# Patient Record
Sex: FEMALE | NOT HISPANIC OR LATINO
[De-identification: names, ages, dates, MRNs, and addresses within clinical notes are randomized per-mention and may not be internally consistent; named-entity substitution may affect disease eponyms.]

---

## 2017-07-10 ENCOUNTER — RESULT REVIEW (OUTPATIENT)
Age: 32
End: 2017-07-10

## 2017-09-29 ENCOUNTER — ASOB RESULT (OUTPATIENT)
Age: 32
End: 2017-09-29

## 2017-09-29 ENCOUNTER — APPOINTMENT (OUTPATIENT)
Dept: ANTEPARTUM | Facility: CLINIC | Age: 32
End: 2017-09-29
Payer: COMMERCIAL

## 2017-09-29 PROCEDURE — 76811 OB US DETAILED SNGL FETUS: CPT

## 2018-01-29 ENCOUNTER — OUTPATIENT (OUTPATIENT)
Dept: OUTPATIENT SERVICES | Facility: HOSPITAL | Age: 33
LOS: 1 days | End: 2018-01-29
Payer: COMMERCIAL

## 2018-01-29 DIAGNOSIS — O26.899 OTHER SPECIFIED PREGNANCY RELATED CONDITIONS, UNSPECIFIED TRIMESTER: ICD-10-CM

## 2018-01-29 DIAGNOSIS — Z3A.00 WEEKS OF GESTATION OF PREGNANCY NOT SPECIFIED: ICD-10-CM

## 2018-01-29 LAB
BLD GP AB SCN SERPL QL: NEGATIVE — SIGNIFICANT CHANGE UP
HCT VFR BLD CALC: 33.9 % — LOW (ref 34.5–45)
HGB BLD-MCNC: 12.3 G/DL — SIGNIFICANT CHANGE UP (ref 11.5–15.5)
MCHC RBC-ENTMCNC: 33.9 PG — SIGNIFICANT CHANGE UP (ref 27–34)
MCHC RBC-ENTMCNC: 36.3 GM/DL — HIGH (ref 32–36)
MCV RBC AUTO: 93.4 FL — SIGNIFICANT CHANGE UP (ref 80–100)
PLATELET # BLD AUTO: 100 K/UL — LOW (ref 150–400)
RBC # BLD: 3.63 M/UL — LOW (ref 3.8–5.2)
RBC # FLD: 12 % — SIGNIFICANT CHANGE UP (ref 10.3–14.5)
RH IG SCN BLD-IMP: POSITIVE — SIGNIFICANT CHANGE UP
T PALLIDUM AB TITR SER: NEGATIVE — SIGNIFICANT CHANGE UP
WBC # BLD: 8.6 K/UL — SIGNIFICANT CHANGE UP (ref 3.8–10.5)
WBC # FLD AUTO: 8.6 K/UL — SIGNIFICANT CHANGE UP (ref 3.8–10.5)

## 2018-01-29 PROCEDURE — 59025 FETAL NON-STRESS TEST: CPT

## 2018-01-29 PROCEDURE — 86901 BLOOD TYPING SEROLOGIC RH(D): CPT

## 2018-01-29 PROCEDURE — 86850 RBC ANTIBODY SCREEN: CPT

## 2018-01-29 PROCEDURE — 59025 FETAL NON-STRESS TEST: CPT | Mod: 26

## 2018-01-29 PROCEDURE — G0463: CPT

## 2018-01-29 PROCEDURE — 86900 BLOOD TYPING SEROLOGIC ABO: CPT

## 2018-01-29 PROCEDURE — 86780 TREPONEMA PALLIDUM: CPT

## 2018-01-29 PROCEDURE — 85027 COMPLETE CBC AUTOMATED: CPT

## 2018-01-30 ENCOUNTER — TRANSCRIPTION ENCOUNTER (OUTPATIENT)
Age: 33
End: 2018-01-30

## 2018-01-30 ENCOUNTER — INPATIENT (INPATIENT)
Facility: HOSPITAL | Age: 33
LOS: 2 days | Discharge: HOME | End: 2018-02-02
Attending: OBSTETRICS & GYNECOLOGY | Admitting: OBSTETRICS & GYNECOLOGY
Payer: COMMERCIAL

## 2018-01-30 VITALS — WEIGHT: 141.1 LBS

## 2018-01-30 DIAGNOSIS — Z3A.00 WEEKS OF GESTATION OF PREGNANCY NOT SPECIFIED: ICD-10-CM

## 2018-01-30 DIAGNOSIS — O26.899 OTHER SPECIFIED PREGNANCY RELATED CONDITIONS, UNSPECIFIED TRIMESTER: ICD-10-CM

## 2018-01-30 DIAGNOSIS — Z34.80 ENCOUNTER FOR SUPERVISION OF OTHER NORMAL PREGNANCY, UNSPECIFIED TRIMESTER: ICD-10-CM

## 2018-01-30 LAB
BASOPHILS # BLD AUTO: 0 K/UL — SIGNIFICANT CHANGE UP (ref 0–0.2)
EOSINOPHIL # BLD AUTO: 0 K/UL — SIGNIFICANT CHANGE UP (ref 0–0.5)
HCT VFR BLD CALC: 36.6 % — SIGNIFICANT CHANGE UP (ref 34.5–45)
HGB BLD-MCNC: 12.6 G/DL — SIGNIFICANT CHANGE UP (ref 11.5–15.5)
LYMPHOCYTES # BLD AUTO: 1.4 K/UL — SIGNIFICANT CHANGE UP (ref 1–3.3)
LYMPHOCYTES # BLD AUTO: 8 % — LOW (ref 13–44)
MCHC RBC-ENTMCNC: 32.6 PG — SIGNIFICANT CHANGE UP (ref 27–34)
MCHC RBC-ENTMCNC: 34.4 GM/DL — SIGNIFICANT CHANGE UP (ref 32–36)
MCV RBC AUTO: 94.6 FL — SIGNIFICANT CHANGE UP (ref 80–100)
METAMYELOCYTES # FLD: 6 % — HIGH (ref 0–0)
MONOCYTES # BLD AUTO: 0.4 K/UL — SIGNIFICANT CHANGE UP (ref 0–0.9)
MONOCYTES NFR BLD AUTO: 5 % — SIGNIFICANT CHANGE UP (ref 2–14)
MYELOCYTES NFR BLD: 5 % — HIGH (ref 0–0)
NEUTROPHILS # BLD AUTO: 6.8 K/UL — SIGNIFICANT CHANGE UP (ref 1.8–7.4)
NEUTROPHILS NFR BLD AUTO: 65 % — SIGNIFICANT CHANGE UP (ref 43–77)
NEUTS BAND # BLD: 11 % — HIGH (ref 0–8)
NRBC # BLD: 2 /100 — HIGH (ref 0–0)
PLAT MORPH BLD: NORMAL — SIGNIFICANT CHANGE UP
PLATELET # BLD AUTO: 94 K/UL — LOW (ref 150–400)
POLYCHROMASIA BLD QL SMEAR: SIGNIFICANT CHANGE UP
RBC # BLD: 3.87 M/UL — SIGNIFICANT CHANGE UP (ref 3.8–5.2)
RBC # FLD: 11.8 % — SIGNIFICANT CHANGE UP (ref 10.3–14.5)
RBC BLD AUTO: ABNORMAL
WBC # BLD: 8.6 K/UL — SIGNIFICANT CHANGE UP (ref 3.8–10.5)
WBC # FLD AUTO: 8.6 K/UL — SIGNIFICANT CHANGE UP (ref 3.8–10.5)

## 2018-01-30 RX ORDER — SODIUM CHLORIDE 9 MG/ML
1000 INJECTION, SOLUTION INTRAVENOUS
Qty: 0 | Refills: 0 | Status: DISCONTINUED | OUTPATIENT
Start: 2018-01-30 | End: 2018-02-02

## 2018-01-30 RX ORDER — CEFAZOLIN SODIUM 1 G
2000 VIAL (EA) INJECTION EVERY 8 HOURS
Qty: 0 | Refills: 0 | Status: COMPLETED | OUTPATIENT
Start: 2018-01-30 | End: 2018-01-31

## 2018-01-30 RX ORDER — LEVOTHYROXINE SODIUM 125 MCG
50 TABLET ORAL DAILY
Qty: 0 | Refills: 0 | Status: DISCONTINUED | OUTPATIENT
Start: 2018-01-30 | End: 2018-02-02

## 2018-01-30 RX ORDER — OXYCODONE HYDROCHLORIDE 5 MG/1
5 TABLET ORAL EVERY 4 HOURS
Qty: 0 | Refills: 0 | Status: DISCONTINUED | OUTPATIENT
Start: 2018-01-30 | End: 2018-02-02

## 2018-01-30 RX ORDER — HEPARIN SODIUM 5000 [USP'U]/ML
5000 INJECTION INTRAVENOUS; SUBCUTANEOUS EVERY 12 HOURS
Qty: 0 | Refills: 0 | Status: DISCONTINUED | OUTPATIENT
Start: 2018-01-30 | End: 2018-02-02

## 2018-01-30 RX ORDER — NALOXONE HYDROCHLORIDE 4 MG/.1ML
0.1 SPRAY NASAL
Qty: 0 | Refills: 0 | Status: DISCONTINUED | OUTPATIENT
Start: 2018-01-30 | End: 2018-02-01

## 2018-01-30 RX ORDER — LANOLIN
1 OINTMENT (GRAM) TOPICAL
Qty: 0 | Refills: 0 | Status: DISCONTINUED | OUTPATIENT
Start: 2018-01-30 | End: 2018-02-02

## 2018-01-30 RX ORDER — ACETAMINOPHEN 500 MG
975 TABLET ORAL EVERY 6 HOURS
Qty: 0 | Refills: 0 | Status: DISCONTINUED | OUTPATIENT
Start: 2018-01-30 | End: 2018-02-02

## 2018-01-30 RX ORDER — OXYCODONE HYDROCHLORIDE 5 MG/1
5 TABLET ORAL
Qty: 0 | Refills: 0 | Status: COMPLETED | OUTPATIENT
Start: 2018-01-30 | End: 2018-02-06

## 2018-01-30 RX ORDER — IBUPROFEN 200 MG
600 TABLET ORAL EVERY 6 HOURS
Qty: 0 | Refills: 0 | Status: DISCONTINUED | OUTPATIENT
Start: 2018-01-30 | End: 2018-02-02

## 2018-01-30 RX ORDER — FAMOTIDINE 10 MG/ML
20 INJECTION INTRAVENOUS ONCE
Qty: 0 | Refills: 0 | Status: COMPLETED | OUTPATIENT
Start: 2018-01-30 | End: 2018-01-30

## 2018-01-30 RX ORDER — IBUPROFEN 200 MG
600 TABLET ORAL EVERY 6 HOURS
Qty: 0 | Refills: 0 | Status: COMPLETED | OUTPATIENT
Start: 2018-01-30 | End: 2018-12-29

## 2018-01-30 RX ORDER — TETANUS TOXOID, REDUCED DIPHTHERIA TOXOID AND ACELLULAR PERTUSSIS VACCINE, ADSORBED 5; 2.5; 8; 8; 2.5 [IU]/.5ML; [IU]/.5ML; UG/.5ML; UG/.5ML; UG/.5ML
0.5 SUSPENSION INTRAMUSCULAR ONCE
Qty: 0 | Refills: 0 | Status: DISCONTINUED | OUTPATIENT
Start: 2018-01-30 | End: 2018-02-02

## 2018-01-30 RX ORDER — HYDROMORPHONE HYDROCHLORIDE 2 MG/ML
0.5 INJECTION INTRAMUSCULAR; INTRAVENOUS; SUBCUTANEOUS
Qty: 0 | Refills: 0 | Status: DISCONTINUED | OUTPATIENT
Start: 2018-01-30 | End: 2018-02-01

## 2018-01-30 RX ORDER — SODIUM CHLORIDE 9 MG/ML
1000 INJECTION, SOLUTION INTRAVENOUS
Qty: 0 | Refills: 0 | Status: DISCONTINUED | OUTPATIENT
Start: 2018-01-30 | End: 2018-02-01

## 2018-01-30 RX ORDER — DOCUSATE SODIUM 100 MG
100 CAPSULE ORAL
Qty: 0 | Refills: 0 | Status: DISCONTINUED | OUTPATIENT
Start: 2018-01-30 | End: 2018-02-02

## 2018-01-30 RX ORDER — HYDROMORPHONE HYDROCHLORIDE 2 MG/ML
30 INJECTION INTRAMUSCULAR; INTRAVENOUS; SUBCUTANEOUS
Qty: 0 | Refills: 0 | Status: DISCONTINUED | OUTPATIENT
Start: 2018-01-30 | End: 2018-02-01

## 2018-01-30 RX ORDER — CEFAZOLIN SODIUM 1 G
2000 VIAL (EA) INJECTION ONCE
Qty: 0 | Refills: 0 | Status: DISCONTINUED | OUTPATIENT
Start: 2018-01-30 | End: 2018-02-01

## 2018-01-30 RX ORDER — DIPHENHYDRAMINE HCL 50 MG
25 CAPSULE ORAL EVERY 6 HOURS
Qty: 0 | Refills: 0 | Status: DISCONTINUED | OUTPATIENT
Start: 2018-01-30 | End: 2018-02-02

## 2018-01-30 RX ORDER — OXYTOCIN 10 UNIT/ML
41.67 VIAL (ML) INJECTION
Qty: 20 | Refills: 0 | Status: DISCONTINUED | OUTPATIENT
Start: 2018-01-30 | End: 2018-01-30

## 2018-01-30 RX ORDER — SODIUM CHLORIDE 9 MG/ML
1000 INJECTION, SOLUTION INTRAVENOUS ONCE
Qty: 0 | Refills: 0 | Status: COMPLETED | OUTPATIENT
Start: 2018-01-30 | End: 2018-01-30

## 2018-01-30 RX ORDER — GLYCERIN ADULT
1 SUPPOSITORY, RECTAL RECTAL AT BEDTIME
Qty: 0 | Refills: 0 | Status: DISCONTINUED | OUTPATIENT
Start: 2018-01-30 | End: 2018-02-02

## 2018-01-30 RX ORDER — OXYTOCIN 10 UNIT/ML
333.33 VIAL (ML) INJECTION
Qty: 20 | Refills: 0 | Status: DISCONTINUED | OUTPATIENT
Start: 2018-01-30 | End: 2018-02-02

## 2018-01-30 RX ORDER — CITRIC ACID/SODIUM CITRATE 300-500 MG
15 SOLUTION, ORAL ORAL ONCE
Qty: 0 | Refills: 0 | Status: COMPLETED | OUTPATIENT
Start: 2018-01-30 | End: 2018-01-30

## 2018-01-30 RX ORDER — FERROUS SULFATE 325(65) MG
325 TABLET ORAL DAILY
Qty: 0 | Refills: 0 | Status: DISCONTINUED | OUTPATIENT
Start: 2018-01-30 | End: 2018-02-02

## 2018-01-30 RX ORDER — SIMETHICONE 80 MG/1
80 TABLET, CHEWABLE ORAL EVERY 4 HOURS
Qty: 0 | Refills: 0 | Status: DISCONTINUED | OUTPATIENT
Start: 2018-01-30 | End: 2018-02-02

## 2018-01-30 RX ORDER — METOCLOPRAMIDE HCL 10 MG
10 TABLET ORAL ONCE
Qty: 0 | Refills: 0 | Status: COMPLETED | OUTPATIENT
Start: 2018-01-30 | End: 2018-01-30

## 2018-01-30 RX ORDER — ONDANSETRON 8 MG/1
4 TABLET, FILM COATED ORAL EVERY 6 HOURS
Qty: 0 | Refills: 0 | Status: DISCONTINUED | OUTPATIENT
Start: 2018-01-30 | End: 2018-02-01

## 2018-01-30 RX ORDER — OXYTOCIN 10 UNIT/ML
41.67 VIAL (ML) INJECTION
Qty: 20 | Refills: 0 | Status: DISCONTINUED | OUTPATIENT
Start: 2018-01-30 | End: 2018-02-02

## 2018-01-30 RX ORDER — KETOROLAC TROMETHAMINE 30 MG/ML
30 SYRINGE (ML) INJECTION EVERY 6 HOURS
Qty: 0 | Refills: 0 | Status: DISCONTINUED | OUTPATIENT
Start: 2018-01-30 | End: 2018-02-01

## 2018-01-30 RX ADMIN — Medication 30 MILLIGRAM(S): at 21:00

## 2018-01-30 RX ADMIN — ONDANSETRON 4 MILLIGRAM(S): 8 TABLET, FILM COATED ORAL at 20:29

## 2018-01-30 RX ADMIN — FAMOTIDINE 20 MILLIGRAM(S): 10 INJECTION INTRAVENOUS at 13:26

## 2018-01-30 RX ADMIN — SODIUM CHLORIDE 2000 MILLILITER(S): 9 INJECTION, SOLUTION INTRAVENOUS at 12:35

## 2018-01-30 RX ADMIN — SODIUM CHLORIDE 125 MILLILITER(S): 9 INJECTION, SOLUTION INTRAVENOUS at 13:21

## 2018-01-30 RX ADMIN — Medication 15 MILLILITER(S): at 13:26

## 2018-01-30 RX ADMIN — HYDROMORPHONE HYDROCHLORIDE 30 MILLILITER(S): 2 INJECTION INTRAMUSCULAR; INTRAVENOUS; SUBCUTANEOUS at 18:20

## 2018-01-30 RX ADMIN — Medication 30 MILLIGRAM(S): at 20:30

## 2018-01-30 RX ADMIN — Medication 100 MILLIGRAM(S): at 21:50

## 2018-01-30 RX ADMIN — HYDROMORPHONE HYDROCHLORIDE 30 MILLILITER(S): 2 INJECTION INTRAMUSCULAR; INTRAVENOUS; SUBCUTANEOUS at 16:33

## 2018-01-30 RX ADMIN — Medication 10 MILLIGRAM(S): at 21:59

## 2018-01-30 NOTE — CONSULT NOTE ADULT - SUBJECTIVE AND OBJECTIVE BOX
Chief Complaint:      "Low platelets."    History of Present Illness:      Mrs. Mccray is a 32-year-old female who is referred here for evaluation and management of thrombocytopenia.  	She says that she is pregnant with her second child.  The platelets have been trending down and she was told to come here.  She reports that a couple of months ago the platelet count was 106,000, but last week it was 99,000.  She reports that in general she feels fine.  She has no bleeding, bruising.  There is no dark urine.  She is scheduled for a  section in February.    Past Medical History:      Hypothyroidism    Surgical History:       section    Allergies:      No Known Drug Allergies       Medications:      	Current Medications:   		Prenatal Vitamin  		Synthroid       Social History:      Employment: full-time.   Marital: .   Alcohol: Denies alcohol consumption.   Drugs: Denies IV drug use.   Tobacco: Denies smoking.       Family History:      Non-contributory.    Review of Systems:    General: Admits weight gain. Denies body aches, bone pain, chills, fatigue, fevers, loss of appetite, sweats.   Skin: Denies breakouts, dry skin, pruritis, rash.   HEENT: Denies change in vision, earaches, epistaxis, gingival bleeding, hoarseness, loss of hearing, rhinorrhea, sinus congestion, sore throat.   Cardiovascular: Denies chest pain, diaphoresis, palpitations.   Pulmonary: Admits mild dyspnea with exertion. Denies cough, hemoptysis, wheezing.   Gastrointestinal: Denies abdominal pain, bright red blood per rectum, constipation, dark stools, diarrhea, dysphagia, heartburn, nausea, vomiting.   Genitourinary: Admits frequency, nocturia. Denies change in urine color, dysuria, hematuria.   Extremities: Denies arthritis, joint pain, muscle cramps, swelling.   Hematologic: Denies bleeding, easy bruising, history of anemia, lymph node enlargement.   Neurological: Denies difficulty with balance, difficulty with coordination, dizziness, headache, lightheadedness, numbness, tingling, tremors.   Psychiatric: Admits occasional insomnia. Denies anxiety, depression.       Vital Signs:      Measurements - Reading on 2018: Height: 58 in Weight: 140 lbs BMI: 29.3 BSA: 1.57   Vitals Signs - Reading on 2018 at 2:00 PM: Sitting Blood Pressure: 110 / 60 Taken from: Left Arm, Automatic Pulse: 78 bpm Respiration Rate: 16       Physical Exam:      General: The patient appears well in no apparent distress.   Skin: Skin without pallor.  No rashes, erythema or ecchymosis noted.   HEENT: Extraocular muscles are intact.  Sclerae are anicteric.    Neck: The neck is supple with no JVD, no lymphadenopathy.   Heart: Normal S1, S2.  Regular rate and rhythm.  No murmurs, gallops or rubs.   Chest: Lungs are clear to auscultation bilaterally.  No wheezing or rhonchi noted.   Abdomen: Abdomen is soft, non-tender, and gravid.  Extremities: There is no edema, or tenderness.   Neurological: Alert and oriented x 3.  Good motor strength, moves all extremities.      Laboratory Data:      A CBC performed today reports a WBC count of 7.7.  The Hgb is 11.4 with an MCV of 95.  The platelet count is 103,000.    Assessment:      Mrs. Mccray is a 32-year-old female with a mild thrombocytopenia.  She is pregnant and scheduled for a  section next month.  Reportedly earlier in the pregnancy the platelet count was 106,000, but last week it was 99,000.  She does in general feel fine and she has no bleeding, or bruising.  A CBC performed today reports a platelet count of 103,000.  Of note there is also a mild normocytic anemia with a Hgb of 11.4.  	I discussed with the patient and her  who accompanies her today that at this time the platelet count is adequate.  She likely has ITP, or gestational thrombocytopenia, but will want to rule-out a microangiopathic state.  Goal is to maintain the platelet count above 100,000.  Therefore would plan to monitor and if it drops significantly below 100,000 would give a course of steroids.      Plan:      Will send labs today for a CMP, coags, fibrinogen, LDH, haptoglobin.  Will also send an anemia evaluation with iron studies, B12, and folate.  She will return here weekly until delivery to monitor the CBC.  She is due in three weeks. Chief Complaint:      f/u of ITP.    History of Present Illness:      Mrs. Mccray is a 32-year-old female who was referred to Dr. Elise  for evaluation and management of thrombocytopenia. Patient found to have gestational thrombocytopenia. Patient is now s/p delivery, epidural . Patient feels well. Without significant bleeding.    Past Medical History:      Hypothyroidism    Surgical History:       section    Allergies:      No Known Drug Allergies       Medications:      MEDICATIONS  (STANDING):  acetaminophen   Tablet. 975 milliGRAM(s) Oral every 6 hours  ceFAZolin   IVPB 2000 milliGRAM(s) IV Intermittent once  diphtheria/tetanus/pertussis (acellular) Vaccine (ADAcel) 0.5 milliLiter(s) IntraMuscular once  ferrous    sulfate 325 milliGRAM(s) Oral daily  heparin  Injectable 5000 Unit(s) SubCutaneous every 12 hours  HYDROmorphone PCA (1 mG/mL) 30 milliLiter(s) PCA Continuous PCA Continuous  ibuprofen  Tablet 600 milliGRAM(s) Oral every 6 hours  ketorolac   Injectable 30 milliGRAM(s) IV Push every 6 hours  lactated ringers. 1000 milliLiter(s) (125 mL/Hr) IV Continuous <Continuous>  lactated ringers. 1000 milliLiter(s) (125 mL/Hr) IV Continuous <Continuous>  lactated ringers. 1000 milliLiter(s) (125 mL/Hr) IV Continuous <Continuous>  levothyroxine 50 MICROGram(s) Oral daily  oxyCODONE    IR 5 milliGRAM(s) Oral every 3 hours  oxytocin Infusion 41.667 milliUNIT(s)/Min (125 mL/Hr) IV Continuous <Continuous>  oxytocin Infusion 333.333 milliUNIT(s)/Min (1000 mL/Hr) IV Continuous <Continuous>  prenatal multivitamin 1 Tablet(s) Oral daily     Social History:      Employment: full-time.   Marital: .   Alcohol: Denies alcohol consumption.   Drugs: Denies IV drug use.   Tobacco: Denies smoking.       Family History:      Non-contributory.    Review of Systems:    General:  Denies body aches, bone pain, chills, fatigue, fevers, loss of appetite, sweats.   Skin: Denies breakouts, dry skin, pruritis, rash.   HEENT: Denies change in vision, earaches, epistaxis, gingival bleeding, hoarseness, loss of hearing, rhinorrhea, sinus congestion, sore throat.   Cardiovascular: Denies chest pain, diaphoresis, palpitations.   Pulmonary: Admits mild dyspnea with exertion. Denies cough, hemoptysis, wheezing.   Gastrointestinal: Denies abdominal pain, bright red blood per rectum, constipation, dark stools, diarrhea, dysphagia, heartburn, nausea, vomiting.   Genitourinary: Admits frequency, nocturia. Denies change in urine color, dysuria, hematuria.   Extremities: Denies arthritis, joint pain, muscle cramps, swelling.   Hematologic: Denies bleeding, easy bruising, history of anemia, lymph node enlargement.   Neurological: Denies difficulty with balance, difficulty with coordination, dizziness, headache, lightheadedness, numbness, tingling, tremors.   Psychiatric: Admits occasional insomnia. Denies anxiety, depression.       Vital Signs:      Vital Signs Last 24 Hrs  T(C): 36.9 (2018 05:00), Max: 36.9 (2018 20:30)  T(F): 98.4 (2018 05:00), Max: 98.4 (2018 20:30)  HR: 75 (2018 05:00) (66 - 92)  BP: 103/62 (2018 05:00) (98/63 - 141/82)  BP(mean): 78 (2018 18:00) (75 - 106)  RR: 18 (2018 05:00) (17 - 22)  SpO2: 99% (2018 01:00) (96% - 100%)  Physical Exam:      General: The patient appears well in no apparent distress.   Skin: Skin without pallor.  No rashes, erythema or ecchymosis noted.   HEENT: Extraocular muscles are intact.  Sclerae are anicteric.    Neck: The neck is supple with no JVD, no lymphadenopathy.   Heart: Normal S1, S2.  Regular rate and rhythm.  No murmurs, gallops or rubs.   Chest: Lungs are clear to auscultation bilaterally.  No wheezing or rhonchi noted.   Abdomen: Abdomen is soft, non-tender, and gravid.  Extremities: There is no edema, or tenderness.   Neurological: Alert and oriented x 3.  Good motor strength, moves all extremities.      Laboratory Data:       ( @ 06:42)                      11.7  12.1 )-----------( 94                 33.0    Neutrophils = -- (--%)  Lymphocytes = -- (--%)  Eosinophils = -- (--%)  Basophils = -- (--%)  Monocytes = -- (--%)  Bands = --%                          Tox:

## 2018-01-30 NOTE — CONSULT NOTE ADULT - ASSESSMENT
Assessment:        1) thrombocytopenia- ITP. Counts are adequate.    Plan:     1) monitor cbc  2) mgmt as per ob/gyn  3)transfusional support as needed

## 2018-01-31 ENCOUNTER — TRANSCRIPTION ENCOUNTER (OUTPATIENT)
Age: 33
End: 2018-01-31

## 2018-01-31 LAB
HCT VFR BLD CALC: 33 % — LOW (ref 34.5–45)
HGB BLD-MCNC: 11.7 G/DL — SIGNIFICANT CHANGE UP (ref 11.5–15.5)
MCHC RBC-ENTMCNC: 33.7 PG — SIGNIFICANT CHANGE UP (ref 27–34)
MCHC RBC-ENTMCNC: 35.6 GM/DL — SIGNIFICANT CHANGE UP (ref 32–36)
MCV RBC AUTO: 94.6 FL — SIGNIFICANT CHANGE UP (ref 80–100)
PLATELET # BLD AUTO: 94 K/UL — LOW (ref 150–400)
RBC # BLD: 3.49 M/UL — LOW (ref 3.8–5.2)
RBC # FLD: 11.7 % — SIGNIFICANT CHANGE UP (ref 10.3–14.5)
T PALLIDUM AB TITR SER: NEGATIVE — SIGNIFICANT CHANGE UP
WBC # BLD: 12.1 K/UL — HIGH (ref 3.8–10.5)
WBC # FLD AUTO: 12.1 K/UL — HIGH (ref 3.8–10.5)

## 2018-01-31 RX ORDER — OXYCODONE HYDROCHLORIDE 5 MG/1
1 TABLET ORAL
Qty: 0 | Refills: 0 | COMMUNITY
Start: 2018-01-31

## 2018-01-31 RX ADMIN — Medication 50 MICROGRAM(S): at 06:00

## 2018-01-31 RX ADMIN — Medication 30 MILLIGRAM(S): at 13:30

## 2018-01-31 RX ADMIN — Medication 30 MILLIGRAM(S): at 12:55

## 2018-01-31 RX ADMIN — Medication 100 MILLIGRAM(S): at 05:54

## 2018-01-31 RX ADMIN — Medication 1 TABLET(S): at 12:55

## 2018-01-31 RX ADMIN — Medication 30 MILLIGRAM(S): at 03:05

## 2018-01-31 RX ADMIN — Medication 30 MILLIGRAM(S): at 02:00

## 2018-01-31 RX ADMIN — Medication 325 MILLIGRAM(S): at 12:55

## 2018-01-31 RX ADMIN — Medication 30 MILLIGRAM(S): at 20:35

## 2018-01-31 RX ADMIN — HEPARIN SODIUM 5000 UNIT(S): 5000 INJECTION INTRAVENOUS; SUBCUTANEOUS at 17:26

## 2018-01-31 RX ADMIN — Medication 30 MILLIGRAM(S): at 19:50

## 2018-01-31 RX ADMIN — HEPARIN SODIUM 5000 UNIT(S): 5000 INJECTION INTRAVENOUS; SUBCUTANEOUS at 05:56

## 2018-01-31 RX ADMIN — Medication 100 MILLIGRAM(S): at 19:50

## 2018-01-31 NOTE — PROGRESS NOTE ADULT - SUBJECTIVE AND OBJECTIVE BOX
Day _1_ of Anesthesia Pain Management Service    SUBJECTIVE: Patient is doing well with IV PCA    Pain Scale Score:	[X] Refer to charted pain scores    THERAPY:    [ ] IV PCA Morphine		[ ] 5 mg/mL	[ ] 1 mg/mL  [X] IV PCA Hydromorphone	[ ] 5 mg/mL	[X] 1 mg/mL  [ ] IV PCA Fentanyl		[ ] 50 micrograms/mL    Demand dose: 0.2 mg     Lockout: 6 minutes   Continuous Rate: 0 mg/hr  4 Hour Limit: 4 mg    MEDICATIONS  (STANDING):  acetaminophen   Tablet. 975 milliGRAM(s) Oral every 6 hours  ceFAZolin   IVPB 2000 milliGRAM(s) IV Intermittent once  diphtheria/tetanus/pertussis (acellular) Vaccine (ADAcel) 0.5 milliLiter(s) IntraMuscular once  ferrous    sulfate 325 milliGRAM(s) Oral daily  heparin  Injectable 5000 Unit(s) SubCutaneous every 12 hours  HYDROmorphone PCA (1 mG/mL) 30 milliLiter(s) PCA Continuous PCA Continuous  ibuprofen  Tablet 600 milliGRAM(s) Oral every 6 hours  ketorolac   Injectable 30 milliGRAM(s) IV Push every 6 hours  lactated ringers. 1000 milliLiter(s) (125 mL/Hr) IV Continuous <Continuous>  lactated ringers. 1000 milliLiter(s) (125 mL/Hr) IV Continuous <Continuous>  lactated ringers. 1000 milliLiter(s) (125 mL/Hr) IV Continuous <Continuous>  levothyroxine 50 MICROGram(s) Oral daily  oxyCODONE    IR 5 milliGRAM(s) Oral every 3 hours  oxytocin Infusion 41.667 milliUNIT(s)/Min (125 mL/Hr) IV Continuous <Continuous>  oxytocin Infusion 333.333 milliUNIT(s)/Min (1000 mL/Hr) IV Continuous <Continuous>  prenatal multivitamin 1 Tablet(s) Oral daily    MEDICATIONS  (PRN):  diphenhydrAMINE   Capsule 25 milliGRAM(s) Oral every 6 hours PRN Itching  docusate sodium 100 milliGRAM(s) Oral two times a day PRN Stool Softening  glycerin Suppository - Adult 1 Suppository(s) Rectal at bedtime PRN Constipation  HYDROmorphone PCA (1 mG/mL) Rescue Clinician Bolus 0.5 milliGRAM(s) IV Push every 15 minutes PRN for Pain Scale GREATER THAN 6  ibuprofen  Tablet 600 milliGRAM(s) Oral every 6 hours PRN Mild pain or headache  lanolin Ointment 1 Application(s) Topical every 3 hours PRN Sore Nipples  naloxone Injectable 0.1 milliGRAM(s) IV Push every 3 minutes PRN For ANY of the following changes in patient status:  A. RR LESS THAN 10 breaths per minute, B. Oxygen saturation LESS THAN 90%, C. Sedation score of 6  ondansetron Injectable 4 milliGRAM(s) IV Push every 6 hours PRN Nausea  oxyCODONE    IR 5 milliGRAM(s) Oral every 4 hours PRN Severe Pain (7 - 10)  simethicone 80 milliGRAM(s) Chew every 4 hours PRN Gas      OBJECTIVE:    Sedation Score:	[ X] Alert	[ ] Drowsy 	[ ] Arousable	[ ] Asleep	[ ] Unresponsive    Side Effects:	[ ] None	[ ] Nausea	[ ] Vomiting	[X] Pruritus  		[ ] Other:    Vital Signs Last 24 Hrs  T(C): 36.9 (2018 08:44), Max: 36.9 (2018 20:30)  T(F): 98.4 (2018 08:44), Max: 98.4 (2018 20:30)  HR: 85 (2018 08:44) (66 - 92)  BP: 108/68 (2018 08:44) (98/63 - 141/82)  BP(mean): 78 (2018 18:00) (75 - 106)  RR: 18 (2018 08:44) (17 - 22)  SpO2: 96% (2018 08:44) (96% - 100%)    ASSESSMENT/ PLAN    Therapy to  be:               [X] Continued   [ ] Discontinued   [ ] Changed to PRN Analgesics    Documentation and Verification of current medications:   [X] Done	[ ] Not done, not eligible    Comments: IV PCA s/p

## 2018-01-31 NOTE — PROGRESS NOTE ADULT - SUBJECTIVE AND OBJECTIVE BOX
Postpartum Note-  Section POD#1    Prenatal Labs  Blood type: A Positive  Rubella IgG: IMMUNE  RPR: Negative      S:Patient w/o complaints, pain is controlled.  Pt is OOB, tolerating PO, passing flatus. DTV. Lochia WNL.     O:  Vital Signs Last 24 Hrs  T(C): 36.9 (2018 05:00), Max: 36.9 (2018 20:30)  T(F): 98.4 (2018 05:00), Max: 98.4 (2018 20:30)  HR: 75 (2018 05:00) (66 - 92)  BP: 103/62 (2018 05:00) (98/63 - 141/82)  BP(mean): 78 (2018 18:00) (75 - 106)  RR: 18 (2018 05:00) (17 - 22)  SpO2: 99% (2018 01:00) (96% - 100%)  I&O's Summary    2018 07:01  -  2018 07:00  --------------------------------------------------------  IN: 2400 mL / OUT: 3150 mL / NET: -750 mL        Gen: NAD  Abdomen: Soft, appropriate tenderness, non-distended, fundus firm.  Incision: Clean/dry/ intact.  Neg errythema,  Neg ecchymosis .  Sub Q     Lochia WNL  Ext: SCDs in place.  Neg Edema. Nontender    LABS:             12.6   8.6   )-----------( 94       (  @ 13:00 )             36.6                12.3   8.6   )-----------( 100      (  @ 07:52 )             33.9

## 2018-01-31 NOTE — PROGRESS NOTE ADULT - PROBLEM SELECTOR PLAN 1
Increase OOB  Renew IVF  Renew PCEA  DVT ppx  Dressing removed  Due to void  Regular diet  AM CBC  Routine Postpartum/Post-op care  C/W synthroid

## 2018-01-31 NOTE — DISCHARGE NOTE OB - CARE PLAN
Principal Discharge DX:	 delivery delivered  Goal:	Recovery  Assessment and plan of treatment:	Regular diet, activity as tolerated, follow up with MD in 4 weeks.

## 2018-01-31 NOTE — DISCHARGE NOTE OB - CARE PROVIDER_API CALL
Liza Alvarez), Obstetrics and Gynecology  [address] [facility] [address]  [address]  [place] [address]  Phone: [phone]  Fax: [phone]

## 2018-01-31 NOTE — PROGRESS NOTE ADULT - ASSESSMENT
A/P:  32y GP POD # 1 S/P  repeat  section   Doing well    PMHx: hypothyroidism, gestational thrombocytopenia  Current Issues: gestational thrombocytopenia, platelets 94, followed by heme

## 2018-01-31 NOTE — DISCHARGE NOTE OB - PATIENT PORTAL LINK FT
“You can access the Follow[employer] Patient Portal, offered by [facility], by registering with the following website: [email or web address]”

## 2018-01-31 NOTE — PROGRESS NOTE ADULT - SUBJECTIVE AND OBJECTIVE BOX
VS:Vital Signs Last 24 Hrs  T(C): 36.9 (31 Jan 2018 08:44), Max: 36.9 (30 Jan 2018 20:30)  T(F): 98.4 (31 Jan 2018 08:44), Max: 98.4 (30 Jan 2018 20:30)  HR: 85 (31 Jan 2018 08:44) (66 - 92)  BP: 108/68 (31 Jan 2018 08:44) (98/63 - 141/82)  BP(mean): 78 (30 Jan 2018 18:00) (75 - 106)  RR: 18 (31 Jan 2018 08:44) (17 - 22)  SpO2: 96% (31 Jan 2018 08:44) (96% - 100%)    Abdomen soft, fundus firm  Incision clean, dry and intact  Lochia WNL  Voiding, stable

## 2018-01-31 NOTE — DISCHARGE NOTE OB - MEDICATION SUMMARY - MEDICATIONS TO TAKE
I will START or STAY ON the medications listed below when I get home from the hospital:    oxyCODONE 5 mg oral tablet  -- 1 tab(s) by mouth every 3 hours  -- Indication: For  delivery delivered    oxyCODONE 5 mg oral tablet  -- 1 tab(s) by mouth every 4 hours, As needed, Severe Pain (7 - 10)  -- Indication: For  delivery delivered

## 2018-01-31 NOTE — DISCHARGE NOTE OB - MATERIALS PROVIDED
Breastfeeding Mother’s Support Group Information/[facility] Hearing Screen Program/Shaken Baby Prevention Handout/Breastfeeding Guide and Packet/Birth Certificate Instructions/Breastfeeding Log/Discharge Medication Information for Patients and Families Pocket Guide/Guide to Postpartum Care/Back To Sleep Handout/[facility] [place] Screening Program

## 2018-02-01 LAB
BASOPHILS # BLD AUTO: 0 K/UL — SIGNIFICANT CHANGE UP (ref 0–0.2)
BASOPHILS NFR BLD AUTO: 0.3 % — SIGNIFICANT CHANGE UP (ref 0–2)
EOSINOPHIL # BLD AUTO: 0 K/UL — SIGNIFICANT CHANGE UP (ref 0–0.5)
EOSINOPHIL NFR BLD AUTO: 0.1 % — SIGNIFICANT CHANGE UP (ref 0–6)
HCT VFR BLD CALC: 31.2 % — LOW (ref 34.5–45)
HGB BLD-MCNC: 11.2 G/DL — LOW (ref 11.5–15.5)
LYMPHOCYTES # BLD AUTO: 1.5 K/UL — SIGNIFICANT CHANGE UP (ref 1–3.3)
LYMPHOCYTES # BLD AUTO: 15.5 % — SIGNIFICANT CHANGE UP (ref 13–44)
MCHC RBC-ENTMCNC: 34 PG — SIGNIFICANT CHANGE UP (ref 27–34)
MCHC RBC-ENTMCNC: 35.8 GM/DL — SIGNIFICANT CHANGE UP (ref 32–36)
MCV RBC AUTO: 95 FL — SIGNIFICANT CHANGE UP (ref 80–100)
MONOCYTES # BLD AUTO: 0.5 K/UL — SIGNIFICANT CHANGE UP (ref 0–0.9)
MONOCYTES NFR BLD AUTO: 5.3 % — SIGNIFICANT CHANGE UP (ref 2–14)
NEUTROPHILS # BLD AUTO: 7.8 K/UL — HIGH (ref 1.8–7.4)
NEUTROPHILS NFR BLD AUTO: 78.7 % — HIGH (ref 43–77)
PLATELET # BLD AUTO: 93 K/UL — LOW (ref 150–400)
RBC # BLD: 3.28 M/UL — LOW (ref 3.8–5.2)
RBC # FLD: 11.9 % — SIGNIFICANT CHANGE UP (ref 10.3–14.5)
WBC # BLD: 9.9 K/UL — SIGNIFICANT CHANGE UP (ref 3.8–10.5)
WBC # FLD AUTO: 9.9 K/UL — SIGNIFICANT CHANGE UP (ref 3.8–10.5)

## 2018-02-01 RX ORDER — OXYCODONE HYDROCHLORIDE 5 MG/1
5 TABLET ORAL
Qty: 0 | Refills: 0 | Status: DISCONTINUED | OUTPATIENT
Start: 2018-02-01 | End: 2018-02-02

## 2018-02-01 RX ORDER — IBUPROFEN 200 MG
600 TABLET ORAL EVERY 6 HOURS
Qty: 0 | Refills: 0 | Status: DISCONTINUED | OUTPATIENT
Start: 2018-02-01 | End: 2018-02-01

## 2018-02-01 RX ADMIN — Medication 600 MILLIGRAM(S): at 11:57

## 2018-02-01 RX ADMIN — Medication 30 MILLIGRAM(S): at 06:00

## 2018-02-01 RX ADMIN — Medication 600 MILLIGRAM(S): at 17:58

## 2018-02-01 RX ADMIN — HEPARIN SODIUM 5000 UNIT(S): 5000 INJECTION INTRAVENOUS; SUBCUTANEOUS at 17:58

## 2018-02-01 RX ADMIN — Medication 600 MILLIGRAM(S): at 23:58

## 2018-02-01 RX ADMIN — SODIUM CHLORIDE 125 MILLILITER(S): 9 INJECTION, SOLUTION INTRAVENOUS at 05:04

## 2018-02-01 RX ADMIN — Medication 50 MICROGRAM(S): at 05:04

## 2018-02-01 RX ADMIN — Medication 325 MILLIGRAM(S): at 11:54

## 2018-02-01 RX ADMIN — Medication 1 TABLET(S): at 11:54

## 2018-02-01 RX ADMIN — Medication 600 MILLIGRAM(S): at 11:54

## 2018-02-01 RX ADMIN — HEPARIN SODIUM 5000 UNIT(S): 5000 INJECTION INTRAVENOUS; SUBCUTANEOUS at 05:13

## 2018-02-01 RX ADMIN — Medication 30 MILLIGRAM(S): at 05:05

## 2018-02-01 NOTE — PROGRESS NOTE ADULT - SUBJECTIVE AND OBJECTIVE BOX
CAMILLE NEWELL  MRN-[id number]    Patient is a 32y old  Female who presents with a chief complaint of Cesaran delivery (31 Jan 2018 18:19)      Review of System      General:	Denies fatigue, fevers, chills, sweats, decreased appetite.    Skin/Breast: denies pruritis, rash  	  Ophthalmologic: no change in vision or blurring  	  HEENT	Denies dry mouth, oral sores, dysphagia,  change in hearing.    Respiratory and Thorax:  no cough, sob, wheeze, hemoptysis  	  Cardiovascular:	no cp , palp, orthopnea    Gastrointestinal:	no n/v/d constipation    Genitourinary:	no dysuria of frequency, no hematuria, no flank pain    Musculoskeletal:	no bone or joint pain. no muscle aches.     Neurological:	no change in sensory or motor function. no headache. no weakness.     Psychiatric:	no depression, no anxiety, insomnia.     Hematology/Lymphatics:	no bleeding or bruising        Current Meds  MEDICATIONS  (STANDING):  acetaminophen   Tablet. 975 milliGRAM(s) Oral every 6 hours  ceFAZolin   IVPB 2000 milliGRAM(s) IV Intermittent once  diphtheria/tetanus/pertussis (acellular) Vaccine (ADAcel) 0.5 milliLiter(s) IntraMuscular once  ferrous    sulfate 325 milliGRAM(s) Oral daily  heparin  Injectable 5000 Unit(s) SubCutaneous every 12 hours  HYDROmorphone PCA (1 mG/mL) 30 milliLiter(s) PCA Continuous PCA Continuous  ibuprofen  Tablet 600 milliGRAM(s) Oral every 6 hours  ketorolac   Injectable 30 milliGRAM(s) IV Push every 6 hours  lactated ringers. 1000 milliLiter(s) (125 mL/Hr) IV Continuous <Continuous>  lactated ringers. 1000 milliLiter(s) (125 mL/Hr) IV Continuous <Continuous>  lactated ringers. 1000 milliLiter(s) (125 mL/Hr) IV Continuous <Continuous>  levothyroxine 50 MICROGram(s) Oral daily  oxyCODONE    IR 5 milliGRAM(s) Oral every 3 hours  oxytocin Infusion 41.667 milliUNIT(s)/Min (125 mL/Hr) IV Continuous <Continuous>  oxytocin Infusion 333.333 milliUNIT(s)/Min (1000 mL/Hr) IV Continuous <Continuous>  prenatal multivitamin 1 Tablet(s) Oral daily    MEDICATIONS  (PRN):  diphenhydrAMINE   Capsule 25 milliGRAM(s) Oral every 6 hours PRN Itching  docusate sodium 100 milliGRAM(s) Oral two times a day PRN Stool Softening  glycerin Suppository - Adult 1 Suppository(s) Rectal at bedtime PRN Constipation  HYDROmorphone PCA (1 mG/mL) Rescue Clinician Bolus 0.5 milliGRAM(s) IV Push every 15 minutes PRN for Pain Scale GREATER THAN 6  ibuprofen  Tablet 600 milliGRAM(s) Oral every 6 hours PRN Mild pain or headache  lanolin Ointment 1 Application(s) Topical every 3 hours PRN Sore Nipples  naloxone Injectable 0.1 milliGRAM(s) IV Push every 3 minutes PRN For ANY of the following changes in patient status:  A. RR LESS THAN 10 breaths per minute, B. Oxygen saturation LESS THAN 90%, C. Sedation score of 6  ondansetron Injectable 4 milliGRAM(s) IV Push every 6 hours PRN Nausea  oxyCODONE    IR 5 milliGRAM(s) Oral every 4 hours PRN Severe Pain (7 - 10)  simethicone 80 milliGRAM(s) Chew every 4 hours PRN Gas      Vitals  Vital Signs Last 24 Hrs  T(C): 37.1 (01 Feb 2018 05:00), Max: 37.1 (01 Feb 2018 05:00)  T(F): 98.7 (01 Feb 2018 05:00), Max: 98.7 (01 Feb 2018 05:00)  HR: 79 (01 Feb 2018 05:00) (74 - 85)  BP: 110/75 (01 Feb 2018 05:00) (96/65 - 110/75)  BP(mean): --  RR: 18 (01 Feb 2018 05:00) (18 - 18)  SpO2: 99% (01 Feb 2018 01:00) (96% - 99%)    Physical Exam    Constitutional: NAD    Eyes: PERRLA EOMI, anicteric sclera    Heent :No oral sores, no pharyngeal injection. moist mucosa.    Neck: supple, no jvd, no LAD    Respiratory: CTA b/l     Cardiovascular: s1s2, no m/g/r    Gastrointestinal: soft, nt, nd, + BS    Extremities: no c/c/e    Neurological:A&O x 3 moves all ext.    Skin: no rash on exposed skin    Lymph Nodes: no lymphadenopathy.      Lab  CBC Full  -  ( 01 Feb 2018 05:45 )  WBC Count : 9.9 K/uL  Hemoglobin : 11.2 g/dL  Hematocrit : 31.2 %  Platelet Count - Automated : 93 K/uL  Mean Cell Volume : 95.0 fl  Mean Cell Hemoglobin : 34.0 pg  Mean Cell Hemoglobin Concentration : 35.8 gm/dL  Auto Neutrophil # : 7.8 K/uL  Auto Lymphocyte # : 1.5 K/uL  Auto Monocyte # : 0.5 K/uL  Auto Eosinophil # : 0.0 K/uL  Auto Basophil # : 0.0 K/uL  Auto Neutrophil % : 78.7 %  Auto Lymphocyte % : 15.5 %  Auto Monocyte % : 5.3 %  Auto Eosinophil % : 0.1 %  Auto Basophil % : 0.3 %              Rad:    Assessment/Plan

## 2018-02-01 NOTE — PROGRESS NOTE ADULT - SUBJECTIVE AND OBJECTIVE BOX
Postpartum Note-  Section POD#2    Allergies    No Known Allergies    Intolerances    Blood type: A positive  Rubella: Immune  RPR: Nonreactive      S: Patient is a  32y     POD#2 S/P  repeat C/Sec  Subjective: Patient w/o complaints, pain is controlled.  Pt is OOB, tolerating PO, passing flatus, and voiding. Lochia WNL.     Patient is breast and bottle feeding.    O:  Vital Signs Last 24 Hrs  T(C): 37.1 (2018 05:00), Max: 37.1 (2018 05:00)  T(F): 98.7 (2018 05:00), Max: 98.7 (2018 05:00)  HR: 79 (2018 05:00) (74 - 85)  BP: 110/75 (2018 05:00) (96/65 - 110/75)  BP(mean): --  RR: 18 (2018 05:00) (18 - 18)  SpO2: 99% (2018 01:00) (96% - 99%)      Gen: NAD, A&Ox3  CV: RRR, S1 and S2, Lungs CTA B/L  Abdomen: +BSx4, soft, nontender, non distended, fundus firm.  Incision: Clean, dry, and intact.  Negative erythema/edema/ecchymosis.   SubQ with steri strips  Lochia WNL  Ext: Neg edema, Neg calf tenderness    LABS:                          11.2   9.9   )-----------( 93       ( 2018 05:45 )             31.2       A/P:  32y  POD # 2 S/P  repeat  section, doing well    PMHx:   Current Issues: None    Increase OOB  D/C IVF  D/C PCEA  PO Pain Protocol  Continue Regular Diet    Brenda De La Rosa PA-C Postpartum Note-  Section POD#2    Allergies    No Known Allergies    Intolerances    Blood type: A positive  Rubella: Immune  RPR: Nonreactive      S: Patient is a  32y     POD#2 S/P  repeat C/Sec  Subjective: Patient w/o complaints, pain is controlled.  Pt is OOB, tolerating PO, passing flatus, and voiding. Lochia WNL.     Patient is breast and bottle feeding.    O:  Vital Signs Last 24 Hrs  T(C): 37.1 (2018 05:00), Max: 37.1 (2018 05:00)  T(F): 98.7 (2018 05:00), Max: 98.7 (2018 05:00)  HR: 79 (2018 05:00) (74 - 85)  BP: 110/75 (2018 05:00) (96/65 - 110/75)  BP(mean): --  RR: 18 (2018 05:00) (18 - 18)  SpO2: 99% (2018 01:00) (96% - 99%)      Gen: NAD, A&Ox3  CV: RRR, S1 and S2, Lungs CTA B/L  Abdomen: +BSx4, soft, nontender, non distended, fundus firm.  Incision: Clean, dry, and intact.  Negative erythema/edema/ecchymosis.   SubQ with steri strips  Lochia WNL  Ext: Neg edema, Neg calf tenderness    LABS:                          11.2   9.9   )-----------( 93       ( 2018 05:45 )             31.2       A/P:  32y  POD # 2 S/P  repeat  section, doing well    PMHx: hypothyroidism, gestational thrombocytopenia   Current Issues: None    Increase OOB  D/C IVF  D/C PCEA  PO Pain Protocol  Continue Regular Diet    Brenda De La Rosa PA-C

## 2018-02-01 NOTE — PROGRESS NOTE ADULT - SUBJECTIVE AND OBJECTIVE BOX
Vital Signs Last 24 Hrs  T(C): 36.8 (01 Feb 2018 21:00), Max: 37.1 (01 Feb 2018 05:00)  T(F): 98.2 (01 Feb 2018 21:00), Max: 98.7 (01 Feb 2018 05:00)  HR: 75 (01 Feb 2018 21:00) (65 - 84)  BP: 108/74 (01 Feb 2018 21:00) (98/65 - 110/75)  BP(mean): --  RR: 18 (01 Feb 2018 21:00) (16 - 18)  SpO2: 99% (01 Feb 2018 01:00) (99% - 99%)    Abdomen soft  Fundus Firm  Lochia WNL  Voiding  Stable

## 2018-02-01 NOTE — PROGRESS NOTE ADULT - ASSESSMENT
Assessment:        1) thrombocytopenia- ITP. Counts are adequate. no bleeding  2) anmeia- mild-     Plan:     1) monitor cbc  2) mgmt as per ob/gyn  3)transfusional support as needed

## 2018-02-01 NOTE — PROGRESS NOTE ADULT - SUBJECTIVE AND OBJECTIVE BOX
Day 2 of Anesthesia Pain Management Service    SUBJECTIVE: Patient is doing well with IV PCA    Pain Scale Score:	[X] Refer to charted pain scores    THERAPY:    [ ] IV PCA Morphine		[ ] 5 mg/mL	[ ] 1 mg/mL  [X] IV PCA Hydromorphone	[ ] 5 mg/mL	[X] 1 mg/mL  [ ] IV PCA Fentanyl		[ ] 50 micrograms/mL    Demand dose: 0.2 mg     Lockout: 6 minutes   Continuous Rate: 0 mg/hr  4 Hour Limit: 4 mg    MEDICATIONS  (STANDING):  acetaminophen   Tablet. 975 milliGRAM(s) Oral every 6 hours  diphtheria/tetanus/pertussis (acellular) Vaccine (ADAcel) 0.5 milliLiter(s) IntraMuscular once  ferrous    sulfate 325 milliGRAM(s) Oral daily  heparin  Injectable 5000 Unit(s) SubCutaneous every 12 hours  ibuprofen  Tablet 600 milliGRAM(s) Oral every 6 hours  ketorolac   Injectable 30 milliGRAM(s) IV Push every 6 hours  lactated ringers. 1000 milliLiter(s) (125 mL/Hr) IV Continuous <Continuous>  lactated ringers. 1000 milliLiter(s) (125 mL/Hr) IV Continuous <Continuous>  levothyroxine 50 MICROGram(s) Oral daily  oxyCODONE    IR 5 milliGRAM(s) Oral every 3 hours  oxytocin Infusion 41.667 milliUNIT(s)/Min (125 mL/Hr) IV Continuous <Continuous>  oxytocin Infusion 333.333 milliUNIT(s)/Min (1000 mL/Hr) IV Continuous <Continuous>  prenatal multivitamin 1 Tablet(s) Oral daily    MEDICATIONS  (PRN):  diphenhydrAMINE   Capsule 25 milliGRAM(s) Oral every 6 hours PRN Itching  docusate sodium 100 milliGRAM(s) Oral two times a day PRN Stool Softening  glycerin Suppository - Adult 1 Suppository(s) Rectal at bedtime PRN Constipation  ibuprofen  Tablet 600 milliGRAM(s) Oral every 6 hours PRN Mild pain or headache  lanolin Ointment 1 Application(s) Topical every 3 hours PRN Sore Nipples  oxyCODONE    IR 5 milliGRAM(s) Oral every 4 hours PRN Severe Pain (7 - 10)  simethicone 80 milliGRAM(s) Chew every 4 hours PRN Gas      OBJECTIVE:    Sedation Score:	[ X] Alert	[ ] Drowsy 	[ ] Arousable	[ ] Asleep	[ ] Unresponsive    Side Effects:	[X ] None	[ ] Nausea	[ ] Vomiting	[ ] Pruritus  		[ ] Other:    Vital Signs Last 24 Hrs  T(C): 36.8 (01 Feb 2018 09:00), Max: 37.1 (01 Feb 2018 05:00)  T(F): 98.2 (01 Feb 2018 09:00), Max: 98.7 (01 Feb 2018 05:00)  HR: 84 (01 Feb 2018 09:00) (74 - 84)  BP: 105/70 (01 Feb 2018 09:00) (96/65 - 110/75)  BP(mean): --  RR: 16 (01 Feb 2018 09:00) (16 - 18)  SpO2: 99% (01 Feb 2018 01:00) (98% - 99%)    ASSESSMENT/ PLAN    Therapy to  be:               [X] Continued   [ ] Discontinued   [ ] Changed to PRN Analgesics    Documentation and Verification of current medications:   [X] Done	[ ] Not done, not eligible    Comments:

## 2018-02-02 VITALS
TEMPERATURE: 98 F | SYSTOLIC BLOOD PRESSURE: 117 MMHG | DIASTOLIC BLOOD PRESSURE: 79 MMHG | RESPIRATION RATE: 18 BRPM | HEART RATE: 72 BPM

## 2018-02-02 LAB
BASOPHILS # BLD AUTO: 0 K/UL — SIGNIFICANT CHANGE UP (ref 0–0.2)
BASOPHILS NFR BLD AUTO: 0.3 % — SIGNIFICANT CHANGE UP (ref 0–2)
EOSINOPHIL # BLD AUTO: 0 K/UL — SIGNIFICANT CHANGE UP (ref 0–0.5)
EOSINOPHIL NFR BLD AUTO: 0.4 % — SIGNIFICANT CHANGE UP (ref 0–6)
HCT VFR BLD CALC: 34.5 % — SIGNIFICANT CHANGE UP (ref 34.5–45)
HGB BLD-MCNC: 11.7 G/DL — SIGNIFICANT CHANGE UP (ref 11.5–15.5)
LYMPHOCYTES # BLD AUTO: 1.5 K/UL — SIGNIFICANT CHANGE UP (ref 1–3.3)
LYMPHOCYTES # BLD AUTO: 17.1 % — SIGNIFICANT CHANGE UP (ref 13–44)
MCHC RBC-ENTMCNC: 32.2 PG — SIGNIFICANT CHANGE UP (ref 27–34)
MCHC RBC-ENTMCNC: 33.9 GM/DL — SIGNIFICANT CHANGE UP (ref 32–36)
MCV RBC AUTO: 95 FL — SIGNIFICANT CHANGE UP (ref 80–100)
MONOCYTES # BLD AUTO: 0.3 K/UL — SIGNIFICANT CHANGE UP (ref 0–0.9)
MONOCYTES NFR BLD AUTO: 3.7 % — SIGNIFICANT CHANGE UP (ref 2–14)
NEUTROPHILS # BLD AUTO: 7 K/UL — SIGNIFICANT CHANGE UP (ref 1.8–7.4)
NEUTROPHILS NFR BLD AUTO: 78.6 % — HIGH (ref 43–77)
PLATELET # BLD AUTO: 103 K/UL — LOW (ref 150–400)
RBC # BLD: 3.63 M/UL — LOW (ref 3.8–5.2)
RBC # FLD: 11.9 % — SIGNIFICANT CHANGE UP (ref 10.3–14.5)
WBC # BLD: 8.9 K/UL — SIGNIFICANT CHANGE UP (ref 3.8–10.5)
WBC # FLD AUTO: 8.9 K/UL — SIGNIFICANT CHANGE UP (ref 3.8–10.5)

## 2018-02-02 PROCEDURE — 59025 FETAL NON-STRESS TEST: CPT

## 2018-02-02 PROCEDURE — 86780 TREPONEMA PALLIDUM: CPT

## 2018-02-02 PROCEDURE — G0463: CPT

## 2018-02-02 PROCEDURE — 85027 COMPLETE CBC AUTOMATED: CPT

## 2018-02-02 PROCEDURE — 59050 FETAL MONITOR W/REPORT: CPT

## 2018-02-02 RX ADMIN — Medication 50 MICROGRAM(S): at 05:13

## 2018-02-02 RX ADMIN — Medication 600 MILLIGRAM(S): at 12:32

## 2018-02-02 RX ADMIN — Medication 600 MILLIGRAM(S): at 05:59

## 2018-02-02 RX ADMIN — Medication 600 MILLIGRAM(S): at 00:30

## 2018-02-02 RX ADMIN — HEPARIN SODIUM 5000 UNIT(S): 5000 INJECTION INTRAVENOUS; SUBCUTANEOUS at 05:12

## 2018-02-02 RX ADMIN — Medication 600 MILLIGRAM(S): at 13:02

## 2018-02-02 RX ADMIN — Medication 600 MILLIGRAM(S): at 06:26

## 2018-02-02 NOTE — PROGRESS NOTE ADULT - SUBJECTIVE AND OBJECTIVE BOX
Vital Signs Last 24 Hrs  T(C): 36.8 (01 Feb 2018 21:00), Max: 36.8 (01 Feb 2018 09:00)  T(F): 98.2 (01 Feb 2018 21:00), Max: 98.2 (01 Feb 2018 09:00)  HR: 75 (01 Feb 2018 21:00) (65 - 84)  BP: 108/74 (01 Feb 2018 21:00) (101/69 - 108/74)  BP(mean): --  RR: 18 (01 Feb 2018 21:00) (16 - 18)  SpO2: --    Abdomen soft  Fundus Firm  Incision clean, dry and intact  Lochia WNL  voiding  stable

## 2018-02-02 NOTE — PROGRESS NOTE ADULT - SUBJECTIVE AND OBJECTIVE BOX
CAMILLE NEWELL  MRN-[id number]    Patient is a 32y old  Female who presents with a chief complaint of Cesaran delivery (31 Jan 2018 18:19)      Review of System    No new events    Current Meds  MEDICATIONS  (STANDING):  acetaminophen   Tablet. 975 milliGRAM(s) Oral every 6 hours  diphtheria/tetanus/pertussis (acellular) Vaccine (ADAcel) 0.5 milliLiter(s) IntraMuscular once  ferrous    sulfate 325 milliGRAM(s) Oral daily  heparin  Injectable 5000 Unit(s) SubCutaneous every 12 hours  lactated ringers. 1000 milliLiter(s) (125 mL/Hr) IV Continuous <Continuous>  lactated ringers. 1000 milliLiter(s) (125 mL/Hr) IV Continuous <Continuous>  levothyroxine 50 MICROGram(s) Oral daily  oxyCODONE    IR 5 milliGRAM(s) Oral every 3 hours  oxytocin Infusion 41.667 milliUNIT(s)/Min (125 mL/Hr) IV Continuous <Continuous>  oxytocin Infusion 333.333 milliUNIT(s)/Min (1000 mL/Hr) IV Continuous <Continuous>  prenatal multivitamin 1 Tablet(s) Oral daily    MEDICATIONS  (PRN):  diphenhydrAMINE   Capsule 25 milliGRAM(s) Oral every 6 hours PRN Itching  docusate sodium 100 milliGRAM(s) Oral two times a day PRN Stool Softening  glycerin Suppository - Adult 1 Suppository(s) Rectal at bedtime PRN Constipation  ibuprofen  Tablet 600 milliGRAM(s) Oral every 6 hours PRN Mild pain or headache  lanolin Ointment 1 Application(s) Topical every 3 hours PRN Sore Nipples  oxyCODONE    IR 5 milliGRAM(s) Oral every 4 hours PRN Severe Pain (7 - 10)  simethicone 80 milliGRAM(s) Chew every 4 hours PRN Gas      Vitals  Vital Signs Last 24 Hrs  T(C): 36.7 (02 Feb 2018 05:00), Max: 36.8 (01 Feb 2018 09:00)  T(F): 98 (02 Feb 2018 05:00), Max: 98.2 (01 Feb 2018 09:00)  HR: 72 (02 Feb 2018 05:00) (65 - 84)  BP: 117/79 (02 Feb 2018 05:00) (101/69 - 117/79)  BP(mean): --  RR: 18 (02 Feb 2018 05:00) (16 - 18)  SpO2: --    Physical Exam    NAD    Lab  CBC Full  -  ( 01 Feb 2018 05:45 )  WBC Count : 9.9 K/uL  Hemoglobin : 11.2 g/dL  Hematocrit : 31.2 %  Platelet Count - Automated : 93 K/uL  Mean Cell Volume : 95.0 fl  Mean Cell Hemoglobin : 34.0 pg  Mean Cell Hemoglobin Concentration : 35.8 gm/dL  Auto Neutrophil # : 7.8 K/uL  Auto Lymphocyte # : 1.5 K/uL  Auto Monocyte # : 0.5 K/uL  Auto Eosinophil # : 0.0 K/uL  Auto Basophil # : 0.0 K/uL  Auto Neutrophil % : 78.7 %  Auto Lymphocyte % : 15.5 %  Auto Monocyte % : 5.3 %  Auto Eosinophil % : 0.1 %  Auto Basophil % : 0.3 %              Rad:    Assessment/Plan

## 2018-02-02 NOTE — PROGRESS NOTE ADULT - ASSESSMENT
Assessment:        1) thrombocytopenia- ITP. Counts are adequate. no bleeding  2) anmeia- mild-     Plan:     1) monitor cbc  2) mgmt as per ob/gyn  3)outpatient f/u with Dr. Elise

## 2018-10-04 ENCOUNTER — RESULT REVIEW (OUTPATIENT)
Age: 33
End: 2018-10-04

## 2019-04-22 NOTE — DISCHARGE NOTE OB - REASON FOR ADMISSION
----- Message from Vida Aguilar sent at 4/22/2019 10:11 AM CDT -----  Contact:    .Type:  Sooner Apoointment Request    Caller is requesting a sooner appointment.  Caller declined first available appointment listed below.  Caller will not accept being placed on the waitlist and is requesting a message be sent to doctor.  Name of Caller: pt spouse   When is the first available appointment? 05/23  Symptoms: pt has something on her chest about the size of a nickel puss filled   Would the patient rather a call back or a response via [facility]?  Call back   Best Call Back Number: [phone] or [phone]   Additional Information:  Offered caller another physician in group Caller stated pt needs to see primary doctor     Cesaran delivery

## 2020-01-15 NOTE — PATIENT PROFILE OB - EDD BY ULTRASOUND, OB PROFILE
Pt here for hydroxyprogesterone caproate injection ([place]). Injection given to pt in Right Gluteus at 2:35pm.  Pt had no complaint of pain prior to or after injection.  Instructed pt to stay in facility for 15 minutes and report any adverse reactions.  Pt verbalized understanding.        [place] (250 mg)        Lot: [id number]  Exp: 12/2020      
11-Feb-2018

## 2022-01-01 NOTE — PATIENT PROFILE OB - NSTOBACCONEVERSMOKERY/N_GEN_A
Detail Level: Zone Photo Preface (Leave Blank If You Do Not Want): Photographs were obtained today No

## 2022-09-21 NOTE — DISCHARGE NOTE OB - NS MD DC PLAN IMMU FLU REF OTH
[facility]        If you are experiencing any symptoms after discharge, please follow up with your doctor for further instructions.    If you are more than 1 hour late to your scheduled appointment, the appointment will be canceled and rescheduled.    If you are running late to your appointment, please call the phone number listed below to inform us.    [facility]   [address] [place] [address]     Hours of Operation  Monday -Friday 7:00am - 5:30pm  Saturday 8:00am- 11:30am     Scheduling  P:[phone]  F: [phone]       **If your infusion requires blood work be sure to have labs drawn 24-48 hrs prior to your scheduled appointment **       [facility] Lab Hours: Located On The First Floor   Walk in or by appointment  Call Central Scheduling ([phone]) for lab appointment  Monday-Friday: 6:00 am-6:30 pm  Saturday: 6:00 am- 2:30 pm   Sunday: Closed       Renata Regan BSN,OCN-  of Clinical Operations: [phone]     Not indicated for this patient

## 2023-10-27 NOTE — DISCHARGE NOTE OB - IF LEAKING COLOSTRUM, UTILIZE BREAST PADS.  DO NOT USE VAGINAL MINI PADS FOR THIS PURPOSE
10/30/23      Peggy Engel  [address] [facility] [address]       [place],    We have been trying to reach you. We refilled your birth control. Please call [phone] to schedule a follow up or a physical with us.    Sincerely,         Mell Grier DO  [facility]  [address]  [facility] [address]  Phone: [phone]  Fax: [phone]            
Statement Selected